# Patient Record
Sex: MALE | URBAN - METROPOLITAN AREA
[De-identification: names, ages, dates, MRNs, and addresses within clinical notes are randomized per-mention and may not be internally consistent; named-entity substitution may affect disease eponyms.]

---

## 2023-07-13 ENCOUNTER — APPOINTMENT (RX ONLY)
Dept: URBAN - METROPOLITAN AREA CLINIC 103 | Facility: CLINIC | Age: 58
Setting detail: DERMATOLOGY
End: 2023-07-13

## 2023-07-13 DIAGNOSIS — L82.0 INFLAMED SEBORRHEIC KERATOSIS: ICD-10-CM

## 2023-07-13 DIAGNOSIS — B35.4 TINEA CORPORIS: ICD-10-CM

## 2023-07-13 PROBLEM — D23.62 OTHER BENIGN NEOPLASM OF SKIN OF LEFT UPPER LIMB, INCLUDING SHOULDER: Status: ACTIVE | Noted: 2023-07-13

## 2023-07-13 PROCEDURE — 17111 DESTRUCTION B9 LESIONS 15/>: CPT

## 2023-07-13 PROCEDURE — ? PRESCRIPTION

## 2023-07-13 PROCEDURE — ? COUNSELING

## 2023-07-13 PROCEDURE — ? LIQUID NITROGEN

## 2023-07-13 PROCEDURE — 99203 OFFICE O/P NEW LOW 30 MIN: CPT | Mod: 25

## 2023-07-13 RX ORDER — KETOCONAZOLE 20 MG/ML
SHAMPOO, SUSPENSION TOPICAL
Qty: 120 | Refills: 0 | Status: ERX | COMMUNITY
Start: 2023-07-13

## 2023-07-13 RX ORDER — KETOCONAZOLE 20 MG/G
CREAM TOPICAL
Qty: 60 | Refills: 0 | Status: ERX | COMMUNITY
Start: 2023-07-13

## 2023-07-13 RX ADMIN — KETOCONAZOLE: 20 CREAM TOPICAL at 00:00

## 2023-07-13 RX ADMIN — KETOCONAZOLE: 20 SHAMPOO, SUSPENSION TOPICAL at 00:00

## 2023-07-13 ASSESSMENT — LOCATION SIMPLE DESCRIPTION DERM
LOCATION SIMPLE: POSTERIOR NECK
LOCATION SIMPLE: RIGHT UPPER BACK
LOCATION SIMPLE: LEFT AXILLARY VAULT
LOCATION SIMPLE: RIGHT ANTERIOR NECK
LOCATION SIMPLE: RIGHT AXILLARY VAULT
LOCATION SIMPLE: LEFT ANTERIOR NECK
LOCATION SIMPLE: LEFT ANTERIOR NECK
LOCATION SIMPLE: RIGHT ANTERIOR NECK
LOCATION SIMPLE: POSTERIOR NECK

## 2023-07-13 ASSESSMENT — LOCATION DETAILED DESCRIPTION DERM
LOCATION DETAILED: LEFT INFERIOR LATERAL NECK
LOCATION DETAILED: LEFT LATERAL NECK
LOCATION DETAILED: RIGHT POSTERIOR NECK
LOCATION DETAILED: LEFT AXILLARY VAULT
LOCATION DETAILED: RIGHT POSTERIOR NECK
LOCATION DETAILED: RIGHT CLAVICULAR NECK
LOCATION DETAILED: LEFT CLAVICULAR NECK
LOCATION DETAILED: RIGHT CLAVICULAR NECK
LOCATION DETAILED: LEFT CLAVICULAR NECK
LOCATION DETAILED: LEFT POSTERIOR NECK
LOCATION DETAILED: RIGHT INFERIOR UPPER BACK
LOCATION DETAILED: RIGHT AXILLARY VAULT
LOCATION DETAILED: LEFT INFERIOR LATERAL NECK
LOCATION DETAILED: RIGHT INFERIOR LATERAL NECK

## 2023-07-13 ASSESSMENT — LOCATION ZONE DERM
LOCATION ZONE: NECK
LOCATION ZONE: AXILLAE
LOCATION ZONE: NECK
LOCATION ZONE: TRUNK

## 2023-07-13 NOTE — PROCEDURE: LIQUID NITROGEN
Show Spray Paint Technique Variable?: Yes
Include Z78.9 (Other Specified Conditions Influencing Health Status) As An Associated Diagnosis?: No
Medical Necessity Information: It is in your best interest to select a reason for this procedure from the list below. All of these items fulfill various CMS LCD requirements except the new and changing color options.
Number Of Freeze-Thaw Cycles: 3 freeze-thaw cycles
Duration Of Freeze Thaw-Cycle (Seconds): 5
Post-Care Instructions: I reviewed with the patient in detail post-care instructions. Patient is to wear sunprotection, and avoid picking at any of the treated lesions. Pt may apply Vaseline to crusted or scabbing areas.
Consent: The patient's consent was obtained including but not limited to risks of crusting, scabbing, blistering, scarring, darker or lighter pigmentary change, recurrence, incomplete removal and infection.
Total Number Of Lesions Treated: [address] [facility]
Spray Paint Text: The liquid nitrogen was applied to the skin utilizing a spray paint frosting technique.
Medical Necessity Clause: This procedure was medically necessary because the lesions that were treated were:
Detail Level: Zone

## 2023-08-11 ENCOUNTER — APPOINTMENT (RX ONLY)
Dept: URBAN - METROPOLITAN AREA CLINIC 103 | Facility: CLINIC | Age: 58
Setting detail: DERMATOLOGY
End: 2023-08-11

## 2023-08-11 DIAGNOSIS — L82.0 INFLAMED SEBORRHEIC KERATOSIS: ICD-10-CM

## 2023-08-11 DIAGNOSIS — B35.4 TINEA CORPORIS: ICD-10-CM

## 2023-08-11 PROCEDURE — ? PRESCRIPTION

## 2023-08-11 PROCEDURE — 17111 DESTRUCTION B9 LESIONS 15/>: CPT

## 2023-08-11 PROCEDURE — ? COUNSELING

## 2023-08-11 PROCEDURE — 99213 OFFICE O/P EST LOW 20 MIN: CPT | Mod: 25

## 2023-08-11 PROCEDURE — ? LIQUID NITROGEN

## 2023-08-11 RX ORDER — KETOCONAZOLE 20 MG/ML
SHAMPOO, SUSPENSION TOPICAL
Qty: 120 | Refills: 0 | Status: ERX

## 2023-08-11 RX ORDER — KETOCONAZOLE 20 MG/G
CREAM TOPICAL
Qty: 60 | Refills: 0 | Status: ERX

## 2023-08-11 ASSESSMENT — LOCATION ZONE DERM
LOCATION ZONE: FACE
LOCATION ZONE: AXILLAE
LOCATION ZONE: TRUNK
LOCATION ZONE: NECK
LOCATION ZONE: AXILLAE
LOCATION ZONE: NECK
LOCATION ZONE: TRUNK
LOCATION ZONE: FACE

## 2023-08-11 ASSESSMENT — LOCATION SIMPLE DESCRIPTION DERM
LOCATION SIMPLE: CHEST
LOCATION SIMPLE: RIGHT ANTERIOR NECK
LOCATION SIMPLE: RIGHT TEMPLE
LOCATION SIMPLE: LEFT AXILLARY VAULT
LOCATION SIMPLE: RIGHT CHEEK
LOCATION SIMPLE: LEFT AXILLARY VAULT
LOCATION SIMPLE: RIGHT TEMPLE
LOCATION SIMPLE: LEFT CHEEK
LOCATION SIMPLE: ABDOMEN
LOCATION SIMPLE: LEFT ANTERIOR NECK
LOCATION SIMPLE: RIGHT ANTERIOR NECK
LOCATION SIMPLE: LEFT CHEEK
LOCATION SIMPLE: CHEST
LOCATION SIMPLE: RIGHT UPPER BACK
LOCATION SIMPLE: LEFT POSTERIOR AXILLA
LOCATION SIMPLE: RIGHT CHEEK
LOCATION SIMPLE: LEFT POSTERIOR AXILLA
LOCATION SIMPLE: ABDOMEN
LOCATION SIMPLE: LEFT ANTERIOR NECK

## 2023-08-11 ASSESSMENT — LOCATION DETAILED DESCRIPTION DERM
LOCATION DETAILED: LEFT AXILLARY VAULT
LOCATION DETAILED: RIGHT INFERIOR TEMPLE
LOCATION DETAILED: LEFT RIB CAGE
LOCATION DETAILED: RIGHT CLAVICULAR NECK
LOCATION DETAILED: RIGHT SUPERIOR LATERAL MALAR CHEEK
LOCATION DETAILED: LEFT CLAVICULAR NECK
LOCATION DETAILED: LEFT RIB CAGE
LOCATION DETAILED: RIGHT CLAVICULAR NECK
LOCATION DETAILED: LEFT POSTERIOR AXILLA
LOCATION DETAILED: RIGHT RIB CAGE
LOCATION DETAILED: RIGHT LATERAL INFERIOR CHEST
LOCATION DETAILED: LEFT POSTERIOR AXILLA
LOCATION DETAILED: RIGHT RIB CAGE
LOCATION DETAILED: RIGHT LATERAL INFERIOR CHEST
LOCATION DETAILED: LEFT SUPERIOR LATERAL MALAR CHEEK
LOCATION DETAILED: RIGHT SUPERIOR LATERAL MALAR CHEEK
LOCATION DETAILED: LEFT CLAVICULAR NECK
LOCATION DETAILED: LEFT SUPERIOR LATERAL MALAR CHEEK
LOCATION DETAILED: LEFT AXILLARY VAULT
LOCATION DETAILED: RIGHT INFERIOR UPPER BACK
LOCATION DETAILED: RIGHT INFERIOR TEMPLE

## 2023-08-11 NOTE — PROCEDURE: LIQUID NITROGEN
Medical Necessity Clause: This procedure was medically necessary because the lesions that were treated were:
Spray Paint Technique: No
Duration Of Freeze Thaw-Cycle (Seconds): 5
Total Number Of Lesions Treated: 15
Render Post Care In The Note?: yes
Post-Care Instructions: I reviewed with the patient in detail post-care instructions. Patient is to wear sunprotection, and avoid picking at any of the treated lesions. Pt may apply Vaseline to crusted or scabbing areas.
Detail Level: Zone
Spray Paint Text: The liquid nitrogen was applied to the skin utilizing a spray paint frosting technique.
Number Of Freeze-Thaw Cycles: 3 freeze-thaw cycles
Medical Necessity Information: It is in your best interest to select a reason for this procedure from the list below. All of these items fulfill various CMS LCD requirements except the new and changing color options.
Consent: The patient's consent was obtained including but not limited to risks of crusting, scabbing, blistering, scarring, darker or lighter pigmentary change, recurrence, incomplete removal and infection.